# Patient Record
Sex: MALE | Race: WHITE | NOT HISPANIC OR LATINO | URBAN - METROPOLITAN AREA
[De-identification: names, ages, dates, MRNs, and addresses within clinical notes are randomized per-mention and may not be internally consistent; named-entity substitution may affect disease eponyms.]

---

## 2023-12-29 ENCOUNTER — OFFICE VISIT (OUTPATIENT)
Dept: URGENT CARE | Facility: CLINIC | Age: 23
End: 2023-12-29
Payer: COMMERCIAL

## 2023-12-29 VITALS
BODY MASS INDEX: 21.84 KG/M2 | RESPIRATION RATE: 18 BRPM | TEMPERATURE: 98.8 F | HEART RATE: 74 BPM | OXYGEN SATURATION: 100 % | DIASTOLIC BLOOD PRESSURE: 70 MMHG | SYSTOLIC BLOOD PRESSURE: 120 MMHG | HEIGHT: 77 IN | WEIGHT: 185 LBS

## 2023-12-29 DIAGNOSIS — R68.89 FLU-LIKE SYMPTOMS: ICD-10-CM

## 2023-12-29 LAB
FLUAV RNA SPEC QL NAA+PROBE: NEGATIVE
FLUBV RNA SPEC QL NAA+PROBE: NEGATIVE
RSV RNA SPEC QL NAA+PROBE: NEGATIVE
SARS-COV-2 RNA RESP QL NAA+PROBE: NEGATIVE

## 2023-12-29 PROCEDURE — 3078F DIAST BP <80 MM HG: CPT

## 2023-12-29 PROCEDURE — 0241U POCT CEPHEID COV-2, FLU A/B, RSV - PCR: CPT

## 2023-12-29 PROCEDURE — 99213 OFFICE O/P EST LOW 20 MIN: CPT

## 2023-12-29 PROCEDURE — 3074F SYST BP LT 130 MM HG: CPT

## 2023-12-29 ASSESSMENT — ENCOUNTER SYMPTOMS
NAUSEA: 0
ABDOMINAL PAIN: 0
SORE THROAT: 0
COUGH: 1
CHILLS: 1
DIARRHEA: 0
SHORTNESS OF BREATH: 0
FEVER: 0
VOMITING: 0

## 2023-12-29 NOTE — PROGRESS NOTES
"  CHIEF COMPLAINT  Chief Complaint   Patient presents with    Congestion     X5days, Body aches, chills, fatigue, headache, loss of appetite     Subjective:   Lazarus Vaca is a 23 y.o. male who presents to urgent care for complaints of congestion, body aches/chills, headache and decreased appetite/ mild nausea x 5 days patient denies any fevers or shortness of breath.  He denies any chest pain. Patient denies history of asthma.  He denies taking any mediations for alleviation of discomfort.       Review of Systems   Constitutional:  Positive for chills and malaise/fatigue. Negative for fever.   HENT:  Positive for congestion. Negative for sore throat.    Respiratory:  Positive for cough. Negative for shortness of breath.    Cardiovascular:  Negative for chest pain.   Gastrointestinal:  Negative for abdominal pain, diarrhea, nausea and vomiting.       PAST MEDICAL HISTORY  There are no problems to display for this patient.      SURGICAL HISTORY  patient denies any surgical history    ALLERGIES  No Known Allergies    CURRENT MEDICATIONS  Home Medications       Reviewed by Faraz Velez (Medical Assistant) on 12/29/23 at 0842  Med List Status: <None>     Medication Last Dose Status        Patient Varghese Taking any Medications                           SOCIAL HISTORY  Social History     Tobacco Use    Smoking status: Never    Smokeless tobacco: Never   Vaping Use    Vaping Use: Never used   Substance and Sexual Activity    Alcohol use: Not on file    Drug use: Not on file    Sexual activity: Not on file       FAMILY HISTORY  No family history on file.      Medications, Allergies, and current problem list reviewed today in Epic.     Objective:     /70 (BP Location: Left arm, Patient Position: Sitting, BP Cuff Size: Adult)   Pulse 74   Temp 37.1 °C (98.8 °F) (Temporal)   Resp 18   Ht 1.956 m (6' 5\")   Wt 83.9 kg (185 lb)   SpO2 100%     Physical Exam  Vitals reviewed.   Constitutional:  "      General: He is not in acute distress.     Appearance: Normal appearance. He is not ill-appearing or toxic-appearing.   HENT:      Head: Normocephalic.      Right Ear: Tympanic membrane normal.      Left Ear: Tympanic membrane normal.      Nose: Nose normal. No congestion or rhinorrhea.      Mouth/Throat:      Mouth: Mucous membranes are moist.      Pharynx: Oropharynx is clear. No oropharyngeal exudate or posterior oropharyngeal erythema.   Cardiovascular:      Rate and Rhythm: Normal rate and regular rhythm.      Pulses: Normal pulses.      Heart sounds: Normal heart sounds.   Pulmonary:      Effort: Pulmonary effort is normal. No respiratory distress.      Breath sounds: Normal breath sounds. No stridor. No wheezing, rhonchi or rales.   Musculoskeletal:      Cervical back: Normal range of motion and neck supple. No rigidity.   Lymphadenopathy:      Cervical: No cervical adenopathy.   Skin:     General: Skin is warm.      Capillary Refill: Capillary refill takes less than 2 seconds.   Neurological:      General: No focal deficit present.      Mental Status: He is alert.   Psychiatric:         Mood and Affect: Mood normal.         Assessment/Plan:     Diagnosis and associated orders:     1. Flu-like symptoms  POCT CEPHEID COV-2, FLU A/B, RSV - PCR         Comments/MDM:     Patient presents to urgent care with complaints of congestion, body aches/chills, and loss of appetite x 3 days.  He denies any shortness of breath or chest pain.  He denies any fevers.  Patient denies taking any medication for alleviation of symptoms.  Upon physical exam patient is alert and in no apparent signs of distress.  Bilateral TMs are normal.  No congestion or rhinorrhea appreciated.  Mucous membranes are moist and clear.  No exudate or erythema.  Neck is supple, no lymph adenopathy.  He is clear to auscultation bilaterally.  No crackles, rhonchi or wheezes appreciated.  Respiratory effort is normal.  Vital signs are stable in  clinic, he is afebrile.  Point-of-care viral swab completed.  Patient notified negative result.  Discussed use of OTC medication for alleviation of discomfort.  Instructed patient on adequate hydration and rest.  Red flag signs and symptoms discussed.  Instructed patient to return to ER or urgent care if symptoms worsen or fail to improve.         Differential diagnosis, natural history, supportive care, and indications for immediate follow-up discussed.    Advised the patient to follow-up with the primary care physician for recheck, reevaluation, and consideration of further management.    Please note that this dictation was created using voice recognition software. I have made a reasonable attempt to correct obvious errors, but I expect that there are errors of grammar and possibly content that I did not discover before finalizing the note.    This note was electronically signed by SAMPSON Dorantes

## 2024-01-11 ENCOUNTER — HOSPITAL ENCOUNTER (OUTPATIENT)
Facility: MEDICAL CENTER | Age: 24
End: 2024-01-11
Attending: STUDENT IN AN ORGANIZED HEALTH CARE EDUCATION/TRAINING PROGRAM
Payer: COMMERCIAL